# Patient Record
Sex: FEMALE | Race: AMERICAN INDIAN OR ALASKA NATIVE
[De-identification: names, ages, dates, MRNs, and addresses within clinical notes are randomized per-mention and may not be internally consistent; named-entity substitution may affect disease eponyms.]

---

## 2018-02-28 ENCOUNTER — HOSPITAL ENCOUNTER (OUTPATIENT)
Dept: HOSPITAL 95 - LAB SHORT | Age: 68
Discharge: HOME | End: 2018-02-28
Attending: INTERNAL MEDICINE
Payer: COMMERCIAL

## 2018-02-28 DIAGNOSIS — N18.3: Primary | ICD-10-CM

## 2018-02-28 LAB
CREAT UR-MCNC: 50 MG/DL (ref 27–270)
PROT UR-MCNC: 7.7 MG/DL (ref 0–11.9)

## 2019-01-09 ENCOUNTER — HOSPITAL ENCOUNTER (OUTPATIENT)
Dept: HOSPITAL 95 - LAB | Age: 69
Discharge: HOME | End: 2019-01-09
Attending: OBSTETRICS & GYNECOLOGY
Payer: COMMERCIAL

## 2019-01-09 DIAGNOSIS — N89.8: Primary | ICD-10-CM

## 2019-01-09 DIAGNOSIS — Z91.89: ICD-10-CM

## 2019-01-09 PROCEDURE — G0123 SCREEN CERV/VAG THIN LAYER: HCPCS

## 2019-01-11 LAB — OTHER STN SPEC: (no result)

## 2019-01-31 ENCOUNTER — HOSPITAL ENCOUNTER (OUTPATIENT)
Dept: HOSPITAL 95 - ORSCSDS | Age: 69
Discharge: HOME | End: 2019-01-31
Attending: SURGERY
Payer: COMMERCIAL

## 2019-01-31 VITALS — WEIGHT: 161.2 LBS | BODY MASS INDEX: 29.29 KG/M2 | HEIGHT: 62.01 IN

## 2019-01-31 DIAGNOSIS — J44.9: ICD-10-CM

## 2019-01-31 DIAGNOSIS — F41.9: ICD-10-CM

## 2019-01-31 DIAGNOSIS — E16.2: ICD-10-CM

## 2019-01-31 DIAGNOSIS — Z79.899: ICD-10-CM

## 2019-01-31 DIAGNOSIS — D12.3: ICD-10-CM

## 2019-01-31 DIAGNOSIS — Z12.11: ICD-10-CM

## 2019-01-31 DIAGNOSIS — E03.9: ICD-10-CM

## 2019-01-31 DIAGNOSIS — D12.5: ICD-10-CM

## 2019-01-31 DIAGNOSIS — K21.9: Primary | ICD-10-CM

## 2019-01-31 DIAGNOSIS — I10: ICD-10-CM

## 2019-01-31 DIAGNOSIS — Z86.010: ICD-10-CM

## 2019-01-31 PROCEDURE — 0DB58ZX EXCISION OF ESOPHAGUS, VIA NATURAL OR ARTIFICIAL OPENING ENDOSCOPIC, DIAGNOSTIC: ICD-10-PCS | Performed by: SURGERY

## 2019-01-31 PROCEDURE — 0DBL8ZX EXCISION OF TRANSVERSE COLON, VIA NATURAL OR ARTIFICIAL OPENING ENDOSCOPIC, DIAGNOSTIC: ICD-10-PCS | Performed by: SURGERY

## 2019-01-31 PROCEDURE — 0DB68ZX EXCISION OF STOMACH, VIA NATURAL OR ARTIFICIAL OPENING ENDOSCOPIC, DIAGNOSTIC: ICD-10-PCS | Performed by: SURGERY

## 2019-01-31 PROCEDURE — 3E0H8GC INTRODUCTION OF OTHER THERAPEUTIC SUBSTANCE INTO LOWER GI, VIA NATURAL OR ARTIFICIAL OPENING ENDOSCOPIC: ICD-10-PCS | Performed by: SURGERY

## 2019-01-31 PROCEDURE — 0DBN8ZX EXCISION OF SIGMOID COLON, VIA NATURAL OR ARTIFICIAL OPENING ENDOSCOPIC, DIAGNOSTIC: ICD-10-PCS | Performed by: SURGERY

## 2019-01-31 NOTE — NUR
01/31/19 1334 Carla Bailey
2 MISSED BY MA IV IN RH,RAC, VALVE 1 IN RH BY JAMISON KIRKLAND;VIKA N OT THREAD 1
GOOD IV IN LW BY RN PT TOW